# Patient Record
Sex: FEMALE
[De-identification: names, ages, dates, MRNs, and addresses within clinical notes are randomized per-mention and may not be internally consistent; named-entity substitution may affect disease eponyms.]

---

## 2023-05-11 ENCOUNTER — HOSPITAL ENCOUNTER (EMERGENCY)
Dept: HOSPITAL 97 - ER | Age: 33
Discharge: HOME | End: 2023-05-11
Payer: COMMERCIAL

## 2023-05-11 VITALS — SYSTOLIC BLOOD PRESSURE: 115 MMHG | OXYGEN SATURATION: 99 % | TEMPERATURE: 98.8 F | DIASTOLIC BLOOD PRESSURE: 80 MMHG

## 2023-05-11 DIAGNOSIS — R21: Primary | ICD-10-CM

## 2023-05-11 PROCEDURE — 99283 EMERGENCY DEPT VISIT LOW MDM: CPT

## 2023-05-11 NOTE — EDPHYS
Physician Documentation                                                                           

 [facility]                                                                 

Name: Osmany Howard                                                                                

Age: 32 yrs                                                                                       

Sex: Female                                                                                       

: 1990                                                                                   

MRN: [id number]                                                                                   

Arrival Date: 2023                                                                          

Time: 10:52                                                                                       

Account#: [id number]                                                                            

Bed 13                                                                                            

Private MD:                                                                                       

ED Physician Scott Calvert                                                                     

HPI:                                                                                              

                                                                                             

11:13 This 32 yrs old  Female presents to ER via Ambulatory with complaints of Rash.  [facility] 

11:13 Is a 32-year-old female with no chronic medical conditions that presents to the         [facility] 

      emergency department with complaints of rash to the arms, chest. Children had similar       

      rash which cleared. Patient states the rash is itchy, denies fever, denies swelling         

      sensation to her throat..                                                                   

                                                                                                  

OB/GYN:                                                                                           

11:17 LMP 2023                                                                            [facility] 

                                                                                                  

Historical:                                                                                       

- Allergies:                                                                                      

11:06 No Known Allergies;                                                                     ld1 

- Home Meds:                                                                                      

11:06 None [Active];                                                                          ld1 

- PMHx:                                                                                           

11:06 None;                                                                                   ld1 

- PSHx:                                                                                           

11:06 Cholecystectomy;  section;                                                      ld1 

                                                                                                  

- Immunization history:: Adult Immunizations up to date, Client reports receiving the             

  2nd dose of the Covid vaccine.                                                                  

- Social history:: Smoking status: Patient denies any tobacco usage or history of.                

  Patient/guardian denies using alcohol.                                                          

                                                                                                  

                                                                                                  

ROS:                                                                                              

11:13 Constitutional: Negative for fever, chills, and weight loss, Cardiovascular: Negative   [facility] 

      for chest pain, palpitations, and edema, Respiratory: Negative for shortness of breath,     

      cough, wheezing, and pleuritic chest pain.                                                  

11:13 Skin: Positive for rash.                                                                    

11:13 All other systems are negative.                                                             

                                                                                                  

Exam:                                                                                             

11:13 Constitutional:  This is a well developed, well nourished patient who is awake, alert,  jmm 

      and in no acute distress. Head/Face:  atraumatic. Eyes:  EOMI, no conjunctival erythema     

      appreciated ENT:  Moist Mucus Membranes Neck:  Trachea midline, Supple Chest/axilla:        

      Normal chest wall appearance and motion.   Cardiovascular:  Regular rate and rhythm.        

      No edema appreciated Respiratory:  Normal respirations, no respiratory distress             

      appreciated Abdomen/GI:  Non distended Back:  Normal ROM                                    

11:13 MS/ Extremity:  Moves all extremities, no obvious deformities appreciated, no edema         

      noted to the lower extremities  Neuro:  Awake and alert Psych:  Behavior is normal,         

      Mood is normal, Patient is cooperative and pleasant                                         

11:13 Skin: Papular rash noted to the arms and chest, no induration, nontender to palpation.      

                                                                                                  

Vital Signs:                                                                                      

11:05 Weight 88.9 kg; Height 5 ft. 5 in. ; Pain 0/10;                                         ld1 

11:10  / 80; Pulse 85; Resp 16; Temp 98.8(O); Pulse Ox 99% on R/A;                      nj1 

11:05 Body Mass Index 32.62 (88.90 kg, 165.1 cm)                                              ld1 

11:05 Pain Scale: Adult                                                                       ld1 

                                                                                                  

MDM:                                                                                              

11:13 Patient medically screened.                                                             [facility] 

14:16 Differential diagnosis: allergic reaction. Data reviewed: vital signs, nurses notes. ED jmm 

      course: Patient is alert nontoxic in appearance in the ED. Will treat with steroids.        

      Patient otherwise advised to follow with dermatology and otherwise given strict return      

      precautions. Patient instructed agrees to plan of care..                                    

                                                                                                  

Administered Medications:                                                                         

No medications were administered                                                                  

                                                                                                  

                                                                                                  

Disposition:                                                                                      

16:15 Co-signature as Attending Physician, Scott Calvert MD I reviewed the patient's care   rt  

      provided by the Advanced Practice Provider and agree with the diagnosis and treatment       

      plan.                                                                                       

                                                                                                  

Disposition Summary:                                                                              

23 11:15                                                                                    

Discharge Ordered                                                                                 

      Location: Home                                                                          [facility] 

      Condition: Stable                                                                       jmm 

      Diagnosis                                                                                   

        - Rash and other nonspecific skin eruption                                            jmm 

      Followup:                                                                               jmm 

        - With: Private Physician                                                                  

        - When: 2 - 3 days                                                                         

        - Reason: Recheck today's complaints, Continuance of care, Re-evaluation by your           

      physician                                                                                   

      Followup:                                                                               jm 

        - With: Jonahtan Fernandes MD                                                                   

        - When: 2 - 3 days                                                                         

        - Reason: Recheck today's complaints, Continuance of care, Re-evaluation by your           

      physician                                                                                   

      Discharge Instructions:                                                                     

        - Discharge Summary Sheet                                                             [facility] 

        - Rash, Adult                                                                         jmm 

      Forms:                                                                                      

        - Medication Reconciliation Form                                                      [facility] 

        - Thank You Letter                                                                    [facility] 

        - Antibiotic Education                                                                [facility] 

        - Prescription Opioid Use                                                             [facility] 

        - Family Work Release                                                                 rg4 

      Prescriptions:                                                                              

        - Elimite 5 % Topical Cream                                                                

            - apply 1 application by TOPICAL route one time Wash after 12 hours.; 60 gram;    [facility] 

      Refills: 0, Product Selection Permitted                                                     

        - Hydroxyzine HCl 25 mg Oral Tablet                                                        

            - take 1 tablet by ORAL route every 6 hours As needed; 30 tablet; Refills: 0,     [facility] 

      Product Selection Permitted                                                                 

        - Prednisone 20 mg Oral Tablet                                                             

            - take 3 tablets by ORAL route once daily for 5 days; 15 tablet; Refills: 0,      [facility] 

      Product Selection Permitted                                                                 

Signatures:                                                                                       

Steven Aponte PA PA jmm Sims, Lauren, RN                        RN   ld1                                                  

Scott Calvert MD MD   rt                                                   

                                                                                                  

**************************************************************************************************

## 2023-05-11 NOTE — ER
Nurse's Notes                                                                                     

 [facility]                                                                 

Name: Osmany Howard                                                                                

Age: 32 yrs                                                                                       

Sex: Female                                                                                       

: 1990                                                                                   

MRN: [id number]                                                                                   

Arrival Date: 2023                                                                          

Time: 10:52                                                                                       

Account#: [id number]                                                                            

Bed 13                                                                                            

Private MD:                                                                                       

Diagnosis: Rash and other nonspecific skin eruption                                               

                                                                                                  

Presentation:                                                                                     

                                                                                             

11:05 Chief complaint: Patient states: Itchy rash since Thursday. JOHN arms, legs, chest.      ld1 

      Coronavirus screen: At this time, the client does not indicate any symptoms associated      

      with coronavirus-19. Ebola Screen: No symptoms or risks identified at this time.            

      Initial Sepsis Screen:. Onset of symptoms was May 11, 2023.                                 

11:05 Method Of Arrival: Ambulatory                                                           ld1 

11:05 Acuity: CANDELARIA 4                                                                           ld1 

11:10 Initial Sepsis Screen: Does the patient meet any 2 criteria? No. Patient's initial      nj1 

      sepsis screen is negative. Does the patient have a suspected source of infection? No.       

      Patient's initial sepsis screen is negative. Risk Assessment: Do you want to hurt           

      yourself or someone else? Patient reports no desire to harm self or others.                 

                                                                                                  

Triage Assessment:                                                                                

11:06 General: Appears in no apparent distress. comfortable, Behavior is calm, cooperative,   ld1 

      appropriate for age. Pain: Denies pain. EENT: No signs and/or symptoms were reported        

      regarding the EENT system. Neuro: Level of Consciousness is awake, alert, obeys             

      commands, Oriented to person, place, time, situation. Cardiovascular: Capillary refill      

      < 3 seconds Patient's skin is warm and dry. Respiratory: Airway is patent Respiratory       

      effort is even, unlabored. GI: Abdomen is flat, non-distended. : No signs and/or          

      symptoms were reported regarding the genitourinary system. Derm: Rash noted that is         

      itchy. Musculoskeletal: No signs and/or symptoms reported regarding the musculoskeletal     

      system.                                                                                     

                                                                                                  

OB/GYN:                                                                                           

11:17 LMP 2023                                                                            [facility] 

                                                                                                  

Historical:                                                                                       

- Allergies:                                                                                      

11:06 No Known Allergies;                                                                     ld1 

- Home Meds:                                                                                      

11:06 None [Active];                                                                          ld1 

- PMHx:                                                                                           

11:06 None;                                                                                   ld1 

- PSHx:                                                                                           

11:06 Cholecystectomy;  section;                                                      ld1 

                                                                                                  

- Immunization history:: Adult Immunizations up to date, Client reports receiving the             

  2nd dose of the Covid vaccine.                                                                  

- Social history:: Smoking status: Patient denies any tobacco usage or history of.                

  Patient/guardian denies using alcohol.                                                          

                                                                                                  

                                                                                                  

Screenin:10 [facility] ED Fall Risk Assessment (Adult) History of falling in the last 3 months,       nj1 

      including since admission No falls in past 3 months (0 pts) Confusion or Disorientation     

      No (0 pts) Intoxicated or Sedated No (0 pts) Impaired Gait No (0 pts) Mobility Assist       

      Device Used No (0 pt) Altered Elimination No (0 pt) Score/Fall Risk Level 0 - 2 = Low       

      Risk Oriented to surroundings, Maintained a safe environment, Hourly rounding (assess       

      needs \T\ fall precautionary measures) done.                                                

11:10 Abuse screen: Denies threats or abuse. Denies injuries from another. Nutritional        nj1 

      screening: No deficits noted. Tuberculosis screening: No symptoms or risk factors           

      identified.                                                                                 

                                                                                                  

Vital Signs:                                                                                      

11:05 Weight 88.9 kg; Height 5 ft. 5 in. ; Pain 0/10;                                         ld1 

11:10  / 80; Pulse 85; Resp 16; Temp 98.8(O); Pulse Ox 99% on R/A;                      nj1 

11:05 Body Mass Index 32.62 (88.90 kg, 165.1 cm)                                              ld1 

11:05 Pain Scale: Adult                                                                       ld1 

                                                                                                  

ED Course:                                                                                        

10:56 Patient arrived in ED.                                                                  mr  

10:57 Steven Aponte PA is PHCP.                                                              jmm 

10:57 Scott Calvert MD is Attending Physician.                                            jmm 

11:05 Kat Devine, RN is Primary Nurse.                                                       nj1 

11:06 Triage completed.                                                                       ld1 

11:06 Arm band placed on right wrist.                                                         ld1 

11:10 Patient has correct armband on for positive identification. Bed in low position. Call   [facility] 

      light in reach. Adult w/ patient.                                                           

11:15 Jonathan Fernandes MD is Referral Physician.                                                 jmm 

11:15 No provider procedures requiring assistance completed. Patient did not have IV access   [facility] 

      during this emergency room visit.                                                           

                                                                                                  

Administered Medications:                                                                         

No medications were administered                                                                  

                                                                                                  

                                                                                                  

Medication:                                                                                       

11:20 VIS not applicable for this client.                                                     nj1 

                                                                                                  

Outcome:                                                                                          

11:15 Discharge ordered by MD.                                                                jm 

11:20 Discharged to home ambulatory, with family.                                             nj1 

11:20 Condition: stable                                                                           

11:20 Discharge instructions given to patient, family, Instructed on discharge instructions,      

      follow up and referral plans. medication usage, Demonstrated understanding of               

      instructions, follow-up care, medications, Prescriptions given X 3.                         

11:30 Patient left the ED.                                                                    nj1 

                                                                                                  

Signatures:                                                                                       

Steven Aponte PA PA jmm Rivera, Mary mr Sims, Lauren, RN                        RN   ld1                                                  

Kat Devine RN                         RN   nj1                                                  

                                                                                                  

**************************************************************************************************

## 2023-05-11 NOTE — XMS REPORT
Continuity of Care Document

                             Created on:May 11, 2023



Patient:EITAN SUAREZ

Sex:Female

:1990

External Reference #:[id number]





Demographics







                          Address                   [address]



                                                    [place] [address]

 

                          Home Phone                [phone]

 

                          Work Phone                [phone]

 

                          Mobile Phone              [phone]

 

                          Email Address             [email or web address]

 

                          Preferred Language        en-US

 

                          Marital Status            Unknown

 

                          Gnosticism Affiliation     Unknown

 

                          Race                      Unknown

 

                          Additional Race(s)        Unavailable

 

                          Ethnic Group              Unknown









Author







                          Organization              [facility]

t

 

                          Address                   [address]



                                                    [place] [address]

 

                          Phone                     [phone]









Support







                Name            Relationship    Address         Phone

 

                BETITO SUAREZ              [address]   [phone]



                                                [place] [address] 

 

                BETITO SUAREZ              [address] [phone]



                                                [place] [address] 









Care Team Providers







                    Name                Role                Phone

 

                    Imer Jimenez MD Primary Care Physician [phone]

 

                    CARINA CHRISTY Attending Clinician Unavailable

 

                    Marlen Alicea      Attending Clinician [phone]

 

                    MARLEN ALICEA      Attending Clinician Unavailable

 

                    WELLNESS            Attending Clinician Unavailable









Payers







           Payer Name Policy Type Policy Number Effective Date Expiration Date ALISON ARREOLA-            [id number]            2022 00:00:00

 



           [employer]                                             







Problems







       Condition Condition Condition Status Onset  Resolution Last   Treating Co

mments 

Source



       Name   Details Category        Date   Date   Treatment Clinician        



                                                 Date                 

 

       UNK     UNK   Diagnosis Active 2021               [facility]

oria



              Active                         14:03:00               l



              2021               00:00:                             Andrei jerez



               [address]                                                  

 

       No known No known Disease                                           [place]

r



       active active                                                  [place]



       problems problems                                                  of



                                                                      [facility]



                                                                      e

 

       Gallbladde  Gallbladd Problem Active               2021            

   [facility]



       r calculus er                                 00:53:41               l



       (disorder) calculus                                                  [facility]

thania



              (disorder)                                                  



              Active                                                  



              Problem                                                  



              2021                                                  



               Medical                                                  



              Group,[facility]                                                  

 

       History of  History Problem Active               2021              

 [facility]



       -      of -                               00:53:41               l



       gynecologi gynecologi                                                  He

rmann



       carlos alberto    carlos alberto                                                     



       disorder disorder                                                  



       (context-d (context-d                                                  



       ependent ependent                                                  



       category) category)                                                  



              Active                                                  



              Problem                                                  



              2021                                                  



               Medical                                                  



              Group,                                                  



              [facility]                                                  

 

       Simple  Simple Problem Active               2021               [facility]

tony



       obesity obesity                             00:53:41               l



       (disorder) (disorder)                                                  He

rmann



              Active                                                  



              Problem                                                  



              2021                                                  



               Medical                                                  



              Group                                                   







Allergies, Adverse Reactions, Alerts







       Allergy Allergy Status Severity Reaction(s) Onset  Inactive Treating Comm

ents 

Source



       Name   Type                        Date   Date   Clinician        

 

       No Known DA     Active U             2013                      [facility]



       Intolera                             2-08                        [facility]



       [facility]                               00[facility]:                      [facility]



                                          [facility]                          [facility]



                                                                      [facility]



                                                                      l

 

       No Known DA     Active U      NONE   2013                      [facility]



       Intolera                             2-08                        [facility]



       [facility]                               00:00:                      [facility]



                                          00                          l



                                                                      [facility]osp[facility]



                                                                      l

 

       No Known No Known Active                                           [facility]

a



       [facility]                                                  l



       on     on                                                      Eligio Herrera                                                  



       s      s                                                       







Social History







           Social Habit Start Date Stop Date  Quantity   Comments   Source

 

           History [facility]

ge



           Alcohol Std                                             of Medicine



           Drinks                                                 

 

           History [facility]

ge



           Alcohol Binge                                             of Medicine

 

           History [facility]

ge



           Alcohol Frequency                                             of Medi

cine

 

           Alcohol Comment 2022 Occasional            [facility]

llege



                      00:00:00   00:00:00                         of Medicine

 

           Tobacco use and 2022 Smokeless tobacco            [facility]

[facility]



           exposure   00:00:00   00:00:00   non-user              of Medicine

 

           Alcohol intake 2022 0 /d                  [facility]

lege



                      00:00:00   00:00:00                         of Medicine

 

           [facility] 2022 Not sure              [facility]

e



           SARS-CoV-2 00:00:00   10:31:00                         of Medicine



           (event)                                                

 

           Social History 2021                       [facility]

luis carlos



                      19:03:59   19:03:59                         

 

           Sex Assigned At 1990                       [facility]

llege



           Birth      00:00:00   00:00:00                         of Medicine









                Smoking Status  Start Date      Stop Date       Source

 

                Never smoked tobacco                                 [facility]

ege of Medicine







Medications







       Ordered Filled Start  Stop   Current Ordering Indication Dosage Frequency

 Signature

                    Comments            Components          Source



     Medication Medication Date Date Medication? Clinician                (SIG) 

          



     Name Name                                                   

 

     5-Methyltet            Yes                      Pill form           [facility]

[facility]



     rahydrofola      324                                              [facility]



     te (METHYL      09:44:                                              of



     FOLATE)      24                                                [facility]



     POWD                                                        e

 

     Labetalol            No                       10 mg,           [facility]

a



                                              Route:           l



               15:38:                               IVP,           [place]



               00                                 Q5Min,           



                                                  Dosing           



                                                  Weight           



                                                  90.568,           



                                                  kg, PRN           



                                                  Elevated           



                                                  BP, Start           



                                                  date:           



                                                  21           



                                                  10:38:00           



                                                  CDT,           



                                                  Duration:           



                                                  5 doses or           



                                                  times,           



                                                  Stop date:           



                                                  Limited #           



                                                  of times           

 

     Acetaminoph      2021-0      No                       1,000 mg,           M

emoria



     en                                       Route: PO,           l



               15:38:                               Drug form:           [facility]



               00                                 TAB, ONCE,           



                                                  Dosing           



                                                  Weight           



                                                  90.568,           



                                                  kg, PRN           



                                                  Pain Score           



                                                  1-3, Start           



                                                  date:           



                                                  21           



                                                  10:38:00           



                                                  CDT            

 

     Labetalol      1-0      No                       10 mg,           [facility]

a



                                              Route:           l



               15:38:                               IVP,           [facility]



               00                                 Q5Min,           



                                                  Dosing           



                                                  Weight           



                                                  90.568,           



                                                  kg, PRN           



                                                  Elevated           



                                                  BP, Start           



                                                  date:           



                                                  21           



                                                  10:38:00           



                                                  CDT,           



                                                  Duration:           



                                                  5 doses or           



                                                  times,           



                                                  Stop date:           



                                                  Limited #           



                                                  of times           

 

     Acetaminoph      2021-0      No                       1,000 mg,           M

emoria



     en                                       Route: PO,           l



               15:38:                               Drug form:           [place]



               00                                 TAB, ONCE,           



                                                  Dosing           



                                                  Weight           



                                                  90.568,           



                                                  kg, PRN           



                                                  Pain Score           



                                                  1-3, Start           



                                                  date:           



                                                  21           



                                                  10:38:00           



                                                  CDT            

 

     Fentanyl      1-0      No                       25             [facility]



               6-02                               microgram,           l



               15:38:                               Route:           [facility]



               00                                 IVP,           



                                                  Q5Min,           



                                                  Dosing           



                                                  Weight           



                                                  90.568,           



                                                  kg, PRN           



                                                  Pain Score           



                                                  4-6,           



                                                  Priority:           



                                                  Routine,           



                                                  Start           



                                                  date:           



                                                  21           



                                                  10:38:00           



                                                  CDT,           



                                                  Duration:           



                                                  4 doses or           



                                                  times,           



                                                  Stop date:           



                                                  Limited #           



                                                  of times           

 

     Fentanyl      2021-0      No                       25             [facility]



               6-02                               microgram,           l



               15:38:                               Route:           [facility]



               00                                 IVP,           



                                                  Q5Min,           



                                                  Dosing           



                                                  Weight           



                                                  90.568,           



                                                  kg, PRN           



                                                  Pain Score           



                                                  4-6,           



                                                  Priority:           



                                                  Routine,           



                                                  Start           



                                                  date:           



                                                  21           



                                                  10:38:00           



                                                  CDT,           



                                                  Duration:           



                                                  4 doses or           



                                                  times,           



                                                  Stop date:           



                                                  Limited #           



                                                  of times           

 

     Hydromorpho      2021-0      No                       0.5 mg,           Mem

oria



     ne                                       Route:           l



               15:38:                               IVP,           [facility]



               00                                 Q5Min,           



                                                  Dosing           



                                                  Weight           



                                                  90.568,           



                                                  kg, PRN           



                                                  Pain Score           



                                                  7-10,           



                                                  Start           



                                                  date:           



                                                  21           



                                                  10:38:00           



                                                  CDT,           



                                                  Duration:           



                                                  4 doses or           



                                                  times,           



                                                  Stop date:           



                                                  Limited #           



                                                  of times           

 

     Flumazenil      1-0      No                       0.2 mg,           [facility]

tony



                                              Route:           l



               15:38:                               IVP, PRN,           [facility]



               00                                 Dosing           



                                                  Weight           



                                                  90.568,           



                                                  kg, PRN           



                                                  Benzodiaze           



                                                  pine           



                                                  Reversal,           



                                                  Initial           



                                                  dose,           



                                                  Start           



                                                  date:           



                                                  21           



                                                  10:38:00           



                                                  CDT,           



                                                  Duration:           



                                                  30 day,           



                                                  Stop date:           



                                                  21           



                                                  10:37:00           



                                                  CDT            

 

     Naloxone      1-0      No                       0.4 mg,           [facility]

a



                                              Route:           l



               15:38:                               IVP,           [place]



               00                                 Q2MIN,           



                                                  Dosing           



                                                  Weight           



                                                  90.568,           



                                                  kg, PRN           



                                                  Narcotic           



                                                  Reversal,           



                                                  Start           



                                                  date:           



                                                  21           



                                                  10:38:00           



                                                  CDT,           



                                                  Duration:           



                                                  8 doses or           



                                                  times,           



                                                  Stop date:           



                                                  Limited #           



                                                  of times           

 

     Meperidine      -0      No                       12.5 mg,           Mem

oria



                                              Route:           l



               15:38:                               IVP,           [facility]



               00                                 Q30Min,           



                                                  Dosing           



                                                  Weight           



                                                  90.568,           



                                                  kg, PRN           



                                                  Other -See           



                                                  Comment,           



                                                  For            



                                                  shivering,           



                                                  Start           



                                                  date:           



                                                  21           



                                                  10:38:00           



                                                  CDT,           



                                                  Duration:           



                                                  2 doses or           



                                                  times,           



                                                  Stop date:           



                                                  Limited #           



                                                  of times           

 

     Ondansetron      1-0      No                       4 mg,           [facility]

[facility]



                                              Route:           l



               15:38:                               IVP, ONCE,           [facility]



               00                                 Dosing           



                                                  Weight           



                                                  90.568,           



                                                  kg, PRN           



                                                  Nausea &           



                                                  Vomiting,           



                                                  Start           



                                                  date:           



                                                  21           



                                                  10:38:00           



                                                  CDT            

 

     Hydromorpho      1-0      No                       0.5 mg,           [facility]

[facility]



     ne                                       Route:           l



               15:38:                               IVP,           [facility]



               00                                 Q5Min,           



                                                  Dosing           



                                                  Weight           



                                                  90.568,           



                                                  kg, PRN           



                                                  Pain Score           



                                                  7-10,           



                                                  Start           



                                                  date:           



                                                  21           



                                                  10:38:00           



                                                  CDT,           



                                                  Duration:           



                                                  4 doses or           



                                                  times,           



                                                  Stop date:           



                                                  Limited #           



                                                  of times           

 

     Flumazenil      1-0      No                       0.2 mg,           [facility]

[facility]



                                              Route:           l



               15:38:                               IVP, PRN,           [place]



               00                                 Dosing           



                                                  Weight           



                                                  90.568,           



                                                  kg, PRN           



                                                  Benzodiaze           



                                                  pine           



                                                  Reversal,           



                                                  Initial           



                                                  dose,           



                                                  Start           



                                                  date:           



                                                  21           



                                                  10:38:00           



                                                  CDT,           



                                                  Duration:           



                                                  30 day,           



                                                  Stop date:           



                                                  21           



                                                  10:37:00           



                                                  CDT            

 

     Naloxone      -0      No                       0.4 mg,           [facility]

[facility]



                                              Route:           l



               15:38:                               IVP,           [place]



               00                                 Q2MIN,           



                                                  Dosing           



                                                  Weight           



                                                  90.568,           



                                                  kg, PRN           



                                                  Narcotic           



                                                  Reversal,           



                                                  Start           



                                                  date:           



                                                  21           



                                                  10:38:00           



                                                  CDT,           



                                                  Duration:           



                                                  8 doses or           



                                                  times,           



                                                  Stop date:           



                                                  Limited #           



                                                  of times           

 

     Meperidine      1-0      No                       12.5 mg,           [facility]

[facility]



                                              Route:           l



               15:38:                               IVP,           [place]



               00                                 Q30Min,           



                                                  Dosing           



                                                  Weight           



                                                  90.568,           



                                                  kg, PRN           



                                                  Other -See           



                                                  Comment,           



                                                  For            



                                                  shivering,           



                                                  Start           



                                                  date:           



                                                  21           



                                                  10:38:00           



                                                  CDT,           



                                                  Duration:           



                                                  2 doses or           



                                                  times,           



                                                  Stop date:           



                                                  Limited #           



                                                  of times           

 

     Ondansetron      1-0      No                       4 mg,           [facility]

[facility]



                                              Route:           l



               15:38:                               IVP, ONCE,           [place]



               00                                 Dosing           



                                                  Weight           



                                                  90.568,           



                                                  kg, PRN           



                                                  Nausea &           



                                                  Vomiting,           



                                                  Start           



                                                  date:           



                                                  21           



                                                  10:38:00           



                                                  CDT            

 

     Labetalol      1-0      No                       10 mg,           [facility]

[facility]



                                              Route:           l



               15:38:                               IVP,           [place]



               00                                 Q5Min,           



                                                  Dosing           



                                                  Weight           



                                                  90.568,           



                                                  kg, PRN           



                                                  Elevated           



                                                  BP, Start           



                                                  date:           



                                                  21           



                                                  10:38:00           



                                                  CDT,           



                                                  Duration:           



                                                  5 doses or           



                                                  times,           



                                                  Stop date:           



                                                  Limited #           



                                                  of times           

 

     Acetaminoph      -0      No                       1,000 mg,           [facility]

[facility]



     en                                       Route: PO,           l



               15:38:                               Drug form:           [facility]



               00                                 TAB, ONCE,           



                                                  Dosing           



                                                  Weight           



                                                  90.568,           



                                                  kg, PRN           



                                                  Pain Score           



                                                  1-3, Start           



                                                  date:           



                                                  21           



                                                  10:38:00           



                                                  CDT            

 

     Fentanyl      -0      No                       25             [facility]



               -02                               microgram,           l



               15:38:                               Route:           [place]



               00                                 IVP,           



                                                  Q5Min,           



                                                  Dosing           



                                                  Weight           



                                                  90.568,           



                                                  kg, PRN           



                                                  Pain Score           



                                                  4-6,           



                                                  Priority:           



                                                  Routine,           



                                                  Start           



                                                  date:           



                                                  21           



                                                  10:38:00           



                                                  CDT,           



                                                  Duration:           



                                                  4 doses or           



                                                  times,           



                                                  Stop date:           



                                                  Limited #           



                                                  of times           

 

     Hydromorpho      1-0      No                       0.5 mg,           [facility]

[facility]



     ne        6-02                               Route:           l



               15:38:                               IVP,           [facility]



               00                                 Q5Min,           



                                                  Dosing           



                                                  Weight           



                                                  90.568,           



                                                  kg, PRN           



                                                  Pain Score           



                                                  7-10,           



                                                  Start           



                                                  date:           



                                                  21           



                                                  10:38:00           



                                                  CDT,           



                                                  Duration:           



                                                  4 doses or           



                                                  times,           



                                                  Stop date:           



                                                  Limited #           



                                                  of times           

 

     Flumazenil      -0      No                       0.2 mg,           [facility]

[facility]



                                              Route:           l



               15:38:                               IVP, PRN,           [place]



               00                                 Dosing           



                                                  Weight           



                                                  90.568,           



                                                  kg, PRN           



                                                  Benzodiaze           



                                                  pine           



                                                  Reversal,           



                                                  Initial           



                                                  dose,           



                                                  Start           



                                                  date:           



                                                  21           



                                                  10:38:00           



                                                  CDT,           



                                                  Duration:           



                                                  30 day,           



                                                  Stop date:           



                                                  21           



                                                  10:37:00           



                                                  CDT            

 

     Naloxone      -0      No                       0.4 mg,           [facility]

[facility]



                                              Route:           l



               15:38:                               IVP,           [facility]



               00                                 Q2MIN,           



                                                  Dosing           



                                                  Weight           



                                                  90.568,           



                                                  kg, PRN           



                                                  Narcotic           



                                                  Reversal,           



                                                  Start           



                                                  date:           



                                                  21           



                                                  10:38:00           



                                                  CDT,           



                                                  Duration:           



                                                  8 doses or           



                                                  times,           



                                                  Stop date:           



                                                  Limited #           



                                                  of times           

 

     Meperidine      -0      No                       12.5 mg,           [facility]

[facility]



                                              Route:           l



               15:38:                               IVP,           [facility]



               00                                 Q30Min,           



                                                  Dosing           



                                                  Weight           



                                                  90.568,           



                                                  kg, PRN           



                                                  Other -See           



                                                  Comment,           



                                                  For            



                                                  shivering,           



                                                  Start           



                                                  date:           



                                                  21           



                                                  10:38:00           



                                                  CDT,           



                                                  Duration:           



                                                  2 doses or           



                                                  times,           



                                                  Stop date:           



                                                  Limited #           



                                                  of times           

 

     Ondansetron      -0      No                       4 mg,           [facility]

[facility]



                                              Route:           l



               15:38:                               IVP, ONCE,           [facility]



               00                                 Dosing           



                                                  Weight           



                                                  90.568,           



                                                  kg, PRN           



                                                  Nausea &           



                                                  Vomiting,           



                                                  Start           



                                                  date:           



                                                  21           



                                                  10:38:00           



                                                  CDT            

 

     Labetalol      -0      No                       10 mg,           [facility]

[facility]



                                              Route:           l



               15:38:                               IVP,           [place]



               00                                 Q5Min,           



                                                  Dosing           



                                                  Weight           



                                                  90.568,           



                                                  kg, PRN           



                                                  Elevated           



                                                  BP, Start           



                                                  date:           



                                                  21           



                                                  10:38:00           



                                                  CDT,           



                                                  Duration:           



                                                  5 doses or           



                                                  times,           



                                                  Stop date:           



                                                  Limited #           



                                                  of times           

 

     Acetaminoph      -0      No                       1,000 mg,           [facility]

[facility]



     en                                       Route: PO,           l



               15:38:                               Drug form:           [place]



               00                                 TAB, ONCE,           



                                                  Dosing           



                                                  Weight           



                                                  90.568,           



                                                  kg, PRN           



                                                  Pain Score           



                                                  1-3, Start           



                                                  date:           



                                                  21           



                                                  10:38:00           



                                                  CDT            

 

     Fentanyl      -0      No                       25             [facility]



               -                               microgram,           l



               15:38:                               Route:           [facility]



               00                                 IVP,           



                                                  Q5Min,           



                                                  Dosing           



                                                  Weight           



                                                  90.568,           



                                                  kg, PRN           



                                                  Pain Score           



                                                  4-6,           



                                                  Priority:           



                                                  Routine,           



                                                  Start           



                                                  date:           



                                                  21           



                                                  10:38:00           



                                                  CDT,           



                                                  Duration:           



                                                  4 doses or           



                                                  times,           



                                                  Stop date:           



                                                  Limited #           



                                                  of times           

 

     Hydromorpho      -0      No                       0.5 mg,           [facility]

[facility]



     ne                                       Route:           l



               15:38:                               IVP,           Eligio



               00                                 Q5Min,           



                                                  Dosing           



                                                  Weight           



                                                  90.568,           



                                                  kg, PRN           



                                                  Pain Score           



                                                  7-10,           



                                                  Start           



                                                  date:           



                                                  21           



                                                  10:38:00           



                                                  CDT,           



                                                  Duration:           



                                                  4 doses or           



                                                  times,           



                                                  Stop date:           



                                                  Limited #           



                                                  of times           

 

     Flumazenil      -0      No                       0.2 mg,           [facility]rett

tony



                                              Route:           l



               15:38:                               IVP, PRN,           [facility]lig[facility]



               00                                 Dosing           



                                                  Weight           



                                                  90.568,           



                                                  kg, PRN           



                                                  Benzodiaze           



                                                  pine           



                                                  Reversal,           



                                                  Initial           



                                                  dose,           



                                                  Start           



                                                  date:           



                                                  21           



                                                  10:38:00           



                                                  CDT,           



                                                  Duration:           



                                                  30 day,           



                                                  Stop date:           



                                                  21           



                                                  10:37:00           



                                                  CDT            

 

     Naloxone      -0      No                       0.4 mg,           [facility]

a



                                              Route:           l



               15:38:                               IVP,           Eligio



               00                                 Q2MIN,           



                                                  Dosing           



                                                  Weight           



                                                  90.568,           



                                                  kg, PRN           



                                                  Narcotic           



                                                  Reversal,           



                                                  Start           



                                                  date:           



                                                  21           



                                                  10:38:00           



                                                  CDT,           



                                                  Duration:           



                                                  8 doses or           



                                                  times,           



                                                  Stop date:           



                                                  Limited #           



                                                  of times           

 

     Meperidine      -0      No                       12.5 mg,           [facility]

[facility]



                                              Route:           l



               15:38:                               IVP,           [facility]lig[facility]



               00                                 Q30Min,           



                                                  Dosing           



                                                  Weight           



                                                  90.568,           



                                                  kg, PRN           



                                                  Other -See           



                                                  Comment,           



                                                  For            



                                                  shivering,           



                                                  Start           



                                                  date:           



                                                  21           



                                                  10:38:00           



                                                  CDT,           



                                                  Duration:           



                                                  2 doses or           



                                                  times,           



                                                  Stop date:           



                                                  Limited #           



                                                  of times           

 

     Ondansetron      -0      No                       4 mg,           [facility]

ia



                                              Route:           l



               15:38:                               IVP, ONCE,           [place]



               00                                 Dosing           



                                                  Weight           



                                                  90.568,           



                                                  kg, PRN           



                                                  Nausea &           



                                                  Vomiting,           



                                                  Start           



                                                  date:           



                                                  21           



                                                  10:38:00           



                                                  CDT            

 

     glycopyrrol      0      No                       Route: IV,           

[facility]



     ate (ANES)                                     Drug form:           l



               15:36:                               INJ, ONCE,                                            Stop date:           



                                                  21           



                                                  10:36:00           



                                                  CDT            

 

     neostigmine      0      No                       Route: IV,           

[facility]



     (ANES)                                     Drug form:           l



               15:36:                               INJ, ONCE,                                            Stop date:           



                                                  21           



                                                  10:36:00           



                                                  CDT            

 

     glycopyrrol      0      No                       Route: IV,           

[facility]



     ate (ANES)                                     Drug form:           l



               15:36:                               INJ, ONCE,                                            Stop date:           



                                                  21           



                                                  10:36:00           



                                                  CDT            

 

     neostigmine      0      No                       Route: IV,           

[facility]



     (ANES)                                     Drug form:           l



               15:36:                               INJ, ONCE,                                            Stop date:           



                                                  21           



                                                  10:36:00           



                                                  CDT            

 

     glycopyrrol      0      No                       Route: IV,           

[facility]



     ate (ANES)                                     Drug form:           l



               15:36:                               INJ, ONCE,                                            Stop date:           



                                                  21           



                                                  10:36:00           



                                                  CDT            

 

     neostigmine      0      No                       Route: IV,           

[facility]



     (ANES)                                     Drug form:           l



               15:36:                               INJ, ONCE,                                            Stop date:           



                                                  21           



                                                  10:36:00           



                                                  CDT            

 

     glycopyrrol      0      No                       Route: IV,           

[facility]



     ate (ANES)                                     Drug form:           l



               15:36:                               INJ, ONCE,                                            Stop date:           



                                                  21           



                                                  10:36:00           



                                                  CDT            

 

     neostigmine      0      No                       Route: IV,           

[facility]



     (ANES)                                     Drug form:           l



               15:36:                               INJ, ONCE,                                            Stop date:           



                                                  21           



                                                  10:36:00           



                                                  CDT            

 

     phenylephri      0      No                       Route: IV,           

[facility]



     ne (ANES)                                     Drug form:           l



               15:33:                               INJ, ONCE,                                            Stop date:           



                                                  21           



                                                  10:33:00           



                                                  CDT            

 

     Docusate      0      Yes                      100 mg = 1           [facility]

[facility]



     Sodium 100      6-02                               cap, PO,           l



     MG Oral      15:33:                               BID, PRN           Andrei

n



     Capsule      00                                 Constipati           



     [Colace]                                         on, # 20           



                                                  cap, 0           



                                                  Refill(s),           



                                                  Pharmacy:           



                                                  [facility]           



                                                  Pharmacy           



                                                  3298,           



                                                  165.1, cm,           



                                                  21           



                                                  9:49:00           



                                                  CDT,           



                                                  Height,           



                                                  90.568,           



                                                  kg,            



                                                  21           



                                                  9:49:00           



                                                  CDT,           



                                                  Weight           

 

     Acetaminoph      0      Yes                      1 tab, PO,           

[facility]



     en 300 MG /      6-02                               Q6H, PRN           l



     Codeine      15:33:                               pain, X 7           [facility]

nn



     Phosphate      00                                 day, # 28           



     30 MG Oral                                         tab, 0           



     Tablet                                         Refill(s),           



     [Tylenol                                         Pharmacy:           



     with                                         [facility]           



     Codeine #3]                                         Pharmacy           



                                                  3298,           



                                                  165.1, cm,           



                                                  21           



                                                  9:49:00           



                                                  CDT,           



                                                  Height,           



                                                  90.568,           



                                                  kg,            



                                                  21           



                                                  9:49:00           



                                                  CDT,           



                                                  Weight           

 

     phenylephri            No                       Route: IV,           

[facility]



     ne (ANES)                                     Drug form:           l



               15:33:                               INJ, ONCE,           [place]



               00                                 Stop date:           



                                                  21           



                                                  10:33:00           



                                                  CDT            

 

     Docusate            Yes                      100 mg = 1           [facility]

[facility]



     Sodium 100      6-02                               cap, PO,           l



     MG Oral      15:33:                               BID, PRN           [facility]rei

n



     Capsule      00                                 Constipati           



     [Colace]                                         on, # 20           



                                                  cap, 0           



                                                  Refill(s),           



                                                  Pharmacy:           



                                                  [facility]           



                                                  Pharmacy           



                                                  3298,           



                                                  165.1, cm,           



                                                  21           



                                                  9:49:00           



                                                  CDT,           



                                                  Height,           



                                                  90.568,           



                                                  kg,            



                                                  21           



                                                  9:49:00           



                                                  CDT,           



                                                  Weight           

 

     Acetaminoph      0      Yes                      1 tab, PO,           

[facility]



     en 300 MG /      6-02                               Q6H, PRN           l



     Codeine      15:33:                               pain, X 7           [facility]

nn



     Phosphate      00                                 day, # 28           



     30 MG Oral                                         tab, 0           



     Tablet                                         Refill(s),           



     [Tylenol                                         Pharmacy:           



     with                                         [facility]           



     Codeine #3]                                         Pharmacy           



                                                  3298,           



                                                  165.1, cm,           



                                                  21           



                                                  9:49:00           



                                                  CDT,           



                                                  Height,           



                                                  90.568,           



                                                  kg,            



                                                  21           



                                                  9:49:00           



                                                  CDT,           



                                                  Weight           

 

     phenylephri      2021-0      No                       Route: IV,           

[facility]



     ne (ANES)                                     Drug form:           l



               15:33:                               INJ, ONCE,                                            Stop date:           



                                                  21           



                                                  10:33:00           



                                                  CDT            

 

     Docusate            Yes                      100 mg = 1           [facility]

[facility]



     Sodium 100      6-02                               cap, PO,           l



     MG Oral      15:33:                               BID, PRN           [facility]rei

n



     Capsule      00                                 Constipati           



     [Colace]                                         on, # 20           



                                                  cap, 0           



                                                  Refill(s),           



                                                  Pharmacy:           



                                                  [facility]           



                                                  Pharmacy           



                                                  3298,           



                                                  165.1, cm,           



                                                  21           



                                                  9:49:00           



                                                  CDT,           



                                                  Height,           



                                                  90.568,           



                                                  kg,            



                                                  21           



                                                  9:49:00           



                                                  CDT,           



                                                  Weight           

 

     Acetaminoph            Yes                      1 tab, PO,           

[facility]



     en 300 MG /      6-02                               Q6H, PRN           l



     Codeine      15:33:                               pain, X 7           [facility]

nn



     Phosphate      00                                 day, # 28           



     30 MG Oral                                         tab, 0           



     Tablet                                         Refill(s),           



     [Tylenol                                         Pharmacy:           



     with                                         [facility]           



     Codeine #3]                                         Pharmacy           



                                                  3298,           



                                                  165.1, cm,           



                                                  21           



                                                  9:49:00           



                                                  CDT,           



                                                  Height,           



                                                  90.568,           



                                                  kg,            



                                                  21           



                                                  9:49:00           



                                                  CDT,           



                                                  Weight           

 

     phenylephri            No                       Route: IV,           

[facility]



     ne (ANES)                                     Drug form:           l



               15:33:                               INJ, ONCE,                                            Stop date:           



                                                  21           



                                                  10:33:00           



                                                  CDT            

 

     Docusate            Yes                      100 mg = 1           [facility]

[facility]



     Sodium 100      6-02                               cap, PO,           l



     MG Oral      15:33:                               BID, PRN           [facility]rei

n



     Capsule      00                                 Constipati           



     [Colace]                                         on, # 20           



                                                  cap, 0           



                                                  Refill(s),           



                                                  Pharmacy:           



                                                  [facility]           



                                                  Pharmacy           



                                                  3298,           



                                                  165.1, cm,           



                                                  21           



                                                  9:49:00           



                                                  CDT,           



                                                  Height,           



                                                  90.568,           



                                                  kg,            



                                                  21           



                                                  9:49:00           



                                                  CDT,           



                                                  Weight           

 

     Acetaminoph            Yes                      1 tab, PO,           

[facility]



     en 300 MG /      6-02                               Q6H, PRN           l



     Codeine      15:33:                               pain, X 7           [facility]

nn



     Phosphate      00                                 day, # 28           



     30 MG Oral                                         tab, 0           



     Tablet                                         Refill(s),           



     [Tylenol                                         Pharmacy:           



     with                                         [facility]           



     Codeine #3]                                         Pharmacy           



                                                  3298,           



                                                  165.1, cm,           



                                                  21           



                                                  9:49:00           



                                                  CDT,           



                                                  Height,           



                                                  90.568,           



                                                  kg,            



                                                  21           



                                                  9:49:00           



                                                  CDT,           



                                                  Weight           

 

     lidocaine      -0      No                       Route: IV,           Me

[facility]



     (ANES)                                     Drug form:           l



               15:18:                               INJ, ONCE,           [facility]                                 Stop date:           



                                                  21           



                                                  10:18:00           



                                                  CDT            

 

     rocuronium      -0      No                       Route: IV,           M

[facility]



     (ANES)                                     Drug form:           l



               15:18:                               INJ, ONCE,                                            Stop date:           



                                                  21           



                                                  10:18:00           



                                                  CDT            

 

     succinylcho      -0      No                       Route: IV,           

[facility]



     line (ANES)                                     Drug form:           l



               15:18:                               INJ, ONCE,                                            Stop date:           



                                                  21           



                                                  10:18:00           



                                                  CDT            

 

     dexamethaso      -0      No                       Route: IV,           

[facility]



     ne (ANES)                                     Drug form:           l



               15:18:                               INJ, ONCE,                                            Stop date:           



                                                  21           



                                                  10:18:00           



                                                  CDT            

 

     midazolam      -0      No                       Route: IV,           Me

[facility]



     (ANES)                                     Drug form:           l



               15:18:                               SOLN,                                            ONCE, Stop           



                                                  date:           



                                                  21           



                                                  10:18:00           



                                                  CDT            

 

     lidocaine      -0      No                       Route: IV,           Me

[facility]



     (ANES)                                     Drug form:           l



               15:18:                               INJ, ONCE,                                            Stop date:           



                                                  21           



                                                  10:18:00           



                                                  CDT            

 

     rocuronium      -0      No                       Route: IV,           M

[facility]



     (ANES)                                     Drug form:           l



               15:18:                               INJ, ONCE,                                            Stop date:           



                                                  21           



                                                  10:18:00           



                                                  CDT            

 

     succinylcho      -0      No                       Route: IV,           

[facility]



     line (ANES)                                     Drug form:           l



               15:18:                               INJ, ONCE,                                            Stop date:           



                                                  21           



                                                  10:18:00           



                                                  CDT            

 

     dexamethaso      -0      No                       Route: IV,           

[facility]



     ne (ANES)                                     Drug form:           l



               15:18:                               INJ, ONCE,           [place]                                 Stop date:           



                                                  21           



                                                  10:18:00           



                                                  CDT            

 

     midazolam      -0      No                       Route: IV,           Me

[facility]



     (ANES)                                     Drug form:           l



               15:18:                               SOLN,           [place]



               00                                 ONCE, Stop           



                                                  date:           



                                                  21           



                                                  10:18:00           



                                                  CDT            

 

     lidocaine      -0      No                       Route: IV,           Me

[facility]



     (ANES)                                     Drug form:           l



               15:18:                               INJ, ONCE,           [place]                                 Stop date:           



                                                  21           



                                                  10:18:00           



                                                  CDT            

 

     rocuronium      -0      No                       Route: IV,           M

[facility]



     (ANES)                                     Drug form:           l



               15:18:                               INJ, ONCE,           [facility]                                 Stop date:           



                                                  21           



                                                  10:18:00           



                                                  CDT            

 

     succinylcho      -0      No                       Route: IV,           

[facility]



     line (ANES)                                     Drug form:           l



               15:18:                               INJ, ONCE,                                            Stop date:           



                                                  21           



                                                  10:18:00           



                                                  CDT            

 

     dexamethaso      -0      No                       Route: IV,           

[facility]



     ne (ANES)                                     Drug form:           l



               15:18:                               INJ, ONCE,           [facility]                                 Stop date:           



                                                  21           



                                                  10:18:00           



                                                  CDT            

 

     midazolam      -0      No                       Route: IV,           Me

[facility]



     (ANES)                                     Drug form:           l



               15:18:                               SOLN,           [place]



               00                                 ONCE, Stop           



                                                  date:           



                                                  21           



                                                  10:18:00           



                                                  CDT            

 

     midazolam      -0      No                       Route: IV,           Me

[facility]



     (ANES)                                     Drug form:           l



               15:18:                               SOLN,           [place]



               00                                 ONCE, Stop           



                                                  date:           



                                                  21           



                                                  10:18:00           



                                                  CDT            

 

     lidocaine      -0      No                       Route: IV,           Me

[facility]



     (ANES)                                     Drug form:           l



               15:18:                               INJ, ONCE,           [place]                                 Stop date:           



                                                  21           



                                                  10:18:00           



                                                  CDT            

 

     rocuronium      -0      No                       Route: IV,           M

[facility]



     (ANES)                                     Drug form:           l



               15:18:                               INJ, ONCE,           [place]                                 Stop date:           



                                                  21           



                                                  10:18:00           



                                                  CDT            

 

     succinylcho      -0      No                       Route: IV,           

[facility]



     line (ANES)      -                               Drug form:           l



               15:18:                               INJ, ONCE,                                            Stop date:           



                                                  21           



                                                  10:18:00           



                                                  CDT            

 

     dexamethaso      -0      No                       Route: IV,           

[facility]



     ne (ANES)      6-                               Drug form:           l



               15:18:                               INJ, ONCE,                                            Stop date:           



                                                  21           



                                                  10:18:00           



                                                  CDT            

 

     ondansetron      -0      No                       Route: IV,           

[facility]



     (ANES)      6-                               Drug form:           l



               15:12:                               INJ, ONCE,                                            Stop date:           



                                                  21           



                                                  10:12:00           



                                                  CDT            

 

     ondansetron      -0      No                       Route: IV,           

[facility]



     (ANES)      6-                               Drug form:           l



               15:12:                               INJ, ONCE,                                            Stop date:           



                                                  21           



                                                  10:12:00           



                                                  CDT            

 

     ondansetron      -0      No                       Route: IV,           

[facility]



     (ANES)      6-                               Drug form:           l



               15:12:                               INJ, ONCE,                                            Stop date:           



                                                  21           



                                                  10:12:00           



                                                  CDT            

 

     ondansetron      -0      No                       Route: IV,           

[facility]



     (ANES)                                     Drug form:           l



               15:12:                               INJ, ONCE,                                            Stop date:           



                                                  21           



                                                  10:12:00           



                                                  CDT            

 

     fentaNYL      -0      No                       Route: IV,           [facility]

[facility]



     (ANES)                                     Drug form:           l



               15:07:                               INJ, ONCE,                                            Stop date:           



                                                  21           



                                                  10:07:00           



                                                  CDT            

 

     propofol      -0      No                       Route: IV,           [facility]

[facility]



     (ANES)      6-                               Drug form:           l



               15:07:                               INJ, ONCE,                                            Stop date:           



                                                  21           



                                                  10:07:00           



                                                  CDT            

 

     ceFAZolin      -0      No                       Route: IV,           Me

[facility]



     (ANES)      6-                               Drug form:           l



               15:07:                               INJ, ONCE,                                            Stop date:           



                                                  21           



                                                  10:07:00           



                                                  CDT            

 

     fentaNYL      -0      No                       Route: IV,           [facility]

[facility]



     (ANES)      -                               Drug form:           l



               15:07:                               INJ, ONCE,                                            Stop date:           



                                                  21           



                                                  10:07:00           



                                                  CDT            

 

     propofol      2021-0      No                       Route: IV,           [facility]

[facility]



     (ANES)      6-                               Drug form:           l



               15:07:                               INJ, ONCE,                                            Stop date:           



                                                  21           



                                                  10:07:00           



                                                  CDT            

 

     ceFAZolin      -0      No                       Route: IV,           Me

[facility]



     (ANES)      6-                               Drug form:           l



               15:07:                               INJ, ONCE,                                            Stop date:           



                                                  21           



                                                  10:07:00           



                                                  CDT            

 

     fentaNYL      -0      No                       Route: IV,           [facility]

[facility]



     (ANES)      6-                               Drug form:           l



               15:07:                               INJ, ONCE,                                            Stop date:           



                                                  21           



                                                  10:07:00           



                                                  CDT            

 

     propofol      -0      No                       Route: IV,           [facility]

[facility]



     (ANES)      6-                               Drug form:           l



               15:07:                               INJ, ONCE,                                            Stop date:           



                                                  21           



                                                  10:07:00           



                                                  CDT            

 

     ceFAZolin      -0      No                       Route: IV,           Me

[facility]



     (ANES)      6-                               Drug form:           l



               15:07:                               INJ, ONCE,                                            Stop date:           



                                                  21           



                                                  10:07:00           



                                                  CDT            

 

     fentaNYL      -0      No                       Route: IV,           [facility]

[facility]



     (ANES)      6-                               Drug form:           l



               15:07:                               INJ, ONCE,                                            Stop date:           



                                                  21           



                                                  10:07:00           



                                                  CDT            

 

     propofol      -0      No                       Route: IV,           [facility]

[facility]



     (ANES)      6-                               Drug form:           l



               15:07:                               INJ, ONCE,                                            Stop date:           



                                                  21           



                                                  10:07:00           



                                                  CDT            

 

     ceFAZolin      -0      No                       Route: IV,           Me

[facility]



     (ANES)      6-02                               Drug form:           l



               15:07:                               INJ, ONCE,                                            Stop date:           



                                                  21           



                                                  10:07:00           



                                                  CDT            

 

     Lactated      -0      No                       Route: IV,           [facility]

[facility]



     Ringers      6-02                               Total           l



     Injection      14:32:                               Volume:           [facility]

nn



     IV (ANES)      00                                 1,000,           



     1000 mL                                         Start           



                                                  date:           



                                                  21           



                                                  9:32:00           



                                                  CDT, Stop           



                                                  date:           



                                                  21           



                                                  10:32:00           



                                                  CDT            

 

     Lactated      -0      No                       Route: IV,           [facility]

[facility]



     Ringers      6-02                               Total           l



     Injection      14:32:                               Volume:           [facility]

nn



     IV (ANES)      00                                 1,000,           



     1000 mL                                         Start           



                                                  date:           



                                                  21           



                                                  9:32:00           



                                                  CDT, Stop           



                                                  date:           



                                                  21           



                                                  10:32:00           



                                                  CDT            

 

     Lactated      -0      No                       Route: IV,           [facility]

[facility]



     Ringers      6-02                               Total           l



     Injection      14:32:                               Volume:           [facility]

[facility]



     IV (ANES)      00                                 1,000,           



     1000 mL                                         Start           



                                                  date:           



                                                  21           



                                                  9:32:00           



                                                  CDT, Stop           



                                                  date:           



                                                  21           



                                                  10:32:00           



                                                  CDT            

 

     Lactated      -0      No                       Route: IV,           [facility]

[facility]



     Ringers      6-02                               Total           l



     Injection      14:32:                               Volume:           [facility]

nn



     IV (ANES)      00                                 1,000,           



     1000 mL                                         Start           



                                                  date:           



                                                  21           



                                                  9:32:00           



                                                  CDT, Stop           



                                                  date:           



                                                  21           



                                                  10:32:00           



                                                  CDT            

 

     Calcium      -0      No                       1,000 mL,           [facility]

[facility]



     Chloride      6-02                               Rate: 75           l



     0.0014      13:37:                               ml/hr,           [place]



     MEQ/ML /      00                                 Infuse           



     Potassium                                         over: 13.3           



     Chloride                                         hr, Route:           



     0.004                                         IV, Dosing           



     MEQ/ML /                                         Weight           



     Sodium                                         90.568 kg,           



     Chloride                                         Total           



     0.103                                         Volume:           



     MEQ/ML /                                         1,000,           



     Sodium                                         Start           



     Lactate                                         date:           



     0.028                                         21           



     MEQ/ML                                         8:37:00           



     Injectable                                         CDT,           



     Solution                                         Duration:           



                                                  1 day,           



                                                  Stop date:           



                                                  21           



                                                  8:36:00           



                                                  CDT, BSA:           



                                                  2.07 m2, 0           

 

     Calcium      -0      No                       1,000 mL,           [facility]

ia



     Chloride      6-02                               Rate: 75           l



     0.0014      13:37:                               ml/hr,           [place]



     MEQ/ML /      00                                 Infuse           



     Potassium                                         over: 13.3           



     Chloride                                         hr, Route:           



     0.004                                         IV, Dosing           



     MEQ/ML /                                         Weight           



     Sodium                                         90.568 kg,           



     Chloride                                         Total           



     0.103                                         Volume:           



     MEQ/ML /                                         1,000,           



     Sodium                                         Start           



     Lactate                                         date:           



     0.028                                         21           



     MEQ/ML                                         8:37:00           



     Injectable                                         CDT,           



     Solution                                         Duration:           



                                                  1 day,           



                                                  Stop date:           



                                                  21           



                                                  8:36:00           



                                                  CDT, BSA:           



                                                  2.07 m2, 0           

 

     Calcium      -0      No                       1,000 mL,           [facility]

ia



     Chloride      6-02                               Rate: 75           l



     0.0014      13:37:                               ml/hr,           [place]



     MEQ/ML /      00                                 Infuse           



     Potassium                                         over: 13.3           



     Chloride                                         hr, Route:           



     0.004                                         IV, Dosing           



     MEQ/ML /                                         Weight           



     Sodium                                         90.568 kg,           



     Chloride                                         Total           



     0.103                                         Volume:           



     MEQ/ML /                                         1,000,           



     Sodium                                         Start           



     Lactate                                         date:           



     0.028                                         21           



     MEQ/ML                                         8:37:00           



     Injectable                                         CDT,           



     Solution                                         Duration:           



                                                  1 day,           



                                                  Stop date:           



                                                  21           



                                                  8:36:00           



                                                  CDT, BSA:           



                                                  2.07 m2, 0           

 

     Calcium      -      No                       1,000 mL,           [facility]

ia



     Chloride                                     Rate: 75           l



     0.0014      13:37:                               ml/hr,           [place]



     MEQ/ML /      00                                 Infuse           



     Potassium                                         over: 13.3           



     Chloride                                         hr, Route:           



     0.004                                         IV, Dosing           



     MEQ/ML /                                         Weight           



     Sodium                                         90.568 kg,           



     Chloride                                         Total           



     0.103                                         Volume:           



     MEQ/ML /                                         1,000,           



     Sodium                                         Start           



     Lactate                                         date:           



     0.028                                         21           



     MEQ/ML                                         8:37:00           



     Injectable                                         CDT,           



     Solution                                         Duration:           



                                                  1 day,           



                                                  Stop date:           



                                                  21           



                                                  8:36:00           



                                                  CDT, BSA:           



                                                  2.07 m2, 0           

 

     Metformin            Yes                      500 mg = 1           Me

mor[facility]



     hydrochlori      5-26                               tab, PO,           l



     de 500 MG      14:54:                               Daily,           Andrei

n



     Oral Tablet      00                                 take with           



                                                  a meal, #           



                                                  30 tab, 1           



                                                  Refill(s)           

 

     Metformin            Yes                      500 mg = 1           Me

moria



     hydrochlori      5-26                               tab, PO,           l



     de 500 MG      14:54:                               Daily,           Andrei

n



     Oral Tablet      00                                 take with           



                                                  a meal, #           



                                                  30 tab, 1           



                                                  Refill(s)           

 

     Metformin            Yes                      500 mg = 1           Me

moria



     hydrochlori      5-26                               tab, PO,           l



     de 500 MG      14:54:                               Daily,           Andrei

n



     Oral Tablet      00                                 take with           



                                                  a meal, #           



                                                  30 tab, 1           



                                                  Refill(s)           

 

     Metformin            Yes                      500 mg = 1           Me

moria



     hydrochlori      5-26                               tab, PO,           l



     de 500 MG      14:54:                               Daily,           Andrei

n



     Oral Tablet      00                                 take with           



                                                  a meal, #           



                                                  30 tab, 1           



                                                  Refill(s)           

 

     Metformin            Yes                      1,000 mg =           Me

moria



     hydrochlori      5-18                               1 tab, PO,           l



     de 1000 MG      19:04:                               BID, 0           [facility]

nn



     Oral Tablet      00                                 Refill(s)           

 

     Metformin            Yes                      1,000 mg =           Me

moria



     hydrochlori      5-18                               1 tab, PO,           l



     de 1000 MG      19:04:                               BID, 0           [facility]

nn



     Oral Tablet      00                                 Refill(s)           

 

     Metformin            Yes                      1,000 mg =           Me

moria



     hydrochlori      5-18                               1 tab, PO,           l



     de 1000 MG      19:04:                               BID, 0           [facility]

nn



     Oral Tablet      00                                 Refill(s)           

 

     Metformin            Yes                      1,000 mg =           Me

moria



     hydrochlori      5-18                               1 tab, PO,           l



     de 1000 MG      19:04:                               BID, 0           [facility]

nn



     Oral Tablet      00                                 Refill(s)           

 

     metformin            Yes                                     [facility]



     (GLUCOPHAGE      3-23                                              [place]



     ) 500 MG      00:00:                                              of



     tablet      00                                                [facility]



                                                                 e







Vital Signs







             Vital Name   Observation Time Observation Value Comments     Source

 

             Body height  2022 14:43:00 165.1 cm                  [facility]

 

             Body weight  2022 14:43:00 91.173 kg                 [facility]

 

             BMI          2022 14:43:00 33.45 kg/m2               [facility]

 

             Height       2021-06-15 13:51:00 165.1 cm                  [facility]

 [place]

 

             Weight       2021-06-15 13:51:00                           [facility]

 [place]

 

             BMI Calculated 2021-06-15 13:51:00                           [facility]

al [place]

 

             Respitory Rate 2021 16:45:00                           [facility]

al [place]

 

             Systolic (mm Hg) 2021 16:45:00                           [facility]

rial [place]

 

             Diastolic (mm Hg) 2021 16:45:00                           [facility]

orial [place]

 

             Respitory Rate 2021 16:30:00                           [facility]

al [place]

 

             Systolic (mm Hg) 2021 16:30:00                           [facility]rett

rial [place]

 

             Diastolic (mm Hg) 2021 16:30:00                           [facility]

[facility] [place]

 

             Respitory Rate 2021 16:16:00                           [facility]

al [place]

 

             Systolic (mm Hg) 2021 16:16:00                           [facility]

rial [place]

 

             Diastolic (mm Hg) 2021 16:16:00                           [facility]

orial [place]

 

             Height       2021 14:49:00 165.1 cm                  [facility]

 [place]

 

             Weight       2021 14:49:00                           [facility]

 [place]

 

             BMI Calculated 2021 14:49:00                           [facility]

al [place]

 

             Systolic (mm Hg) 2021 18:56:00                           [facility]

rial [place]

 

             Diastolic (mm Hg) 2021 18:56:00                           [facility]

[facility] [place]

 

             Heart Rate   2021 18:56:00                           [facility]

 [place]

 

             Height       2021 18:56:00 165.1 cm                  [facility]

 [place]

 

             Weight       2021 18:56:00                           [facility]

 [place]

 

             BMI Calculated 2021 18:56:00                           Loretta au [place]







Procedures







                Procedure       Date / Time     Performing Clinician Source



                                Performed                       

 

                Laparoscopic    2021 05:00:00                 [facility]



                cholecystectomy                                 

 

                IVF                                             [facility]







Plan of Care







             Planned Activity Planned Date Details      Comments     Source

 

             Future Scheduled 2022   TETANUS SHOT (ADULT)              [facility]



             Test         13:59:45     [code = TETANUS SHOT              of Medi

cine



                                       (ADULT)]                  

 

             Future Scheduled 2022   BMI FOLLOW UP PLAN              [facility]



             Test         13:59:45     [code = BMI FOLLOW UP              of Med

icine



                                       PLAN]                     

 

             Future Scheduled 2022   Hepatitis C screening              [facility]



             Test         13:59:45     (procedure) [code =              of Medic

ine



                                       781204214]                

 

             Future Scheduled 2022   Human immunodeficiency              B

[facility]



             Test         13:59:45     virus screening              of [facility]



                                       (procedure) [code =              



                                       307978525]                

 

             Future Scheduled 2022   Screening for malignant              

[facility]



             Test         13:59:45     neoplasm of cervix              of Medici

ne



                                       (procedure) [code =              



                                       536920849]                

 

             Future Scheduled 2022   FLU VACCINE > 6 MONTHS              B

[facility]



             Test         13:59:45     [code = FLU VACCINE > 6              of M

edicine



                                       MONTHS]                   

 

             Future Scheduled 2022   COVID-19 Vaccine (3 -              Ba

[facility]



             Test         13:59:45     Booster for Pfizer              of Medici

ne



                                       series) [code =              



                                       COVID-19 Vaccine (3 -              



                                       Booster for Pfizer              



                                       series)]                  

 

             Future Scheduled 2022   ORT - XR FOOT BILAT 3V Ordered:     B

[facility]



             Test         09:42:33     (CHARGE ONLY) [code = 2022   of Med

icine



                                       80678]                    







Encounters







        Start   End     Encounter Admission Attending Care    Care    Encounter 

Source



        Date/Time Date/Time Type    Type    Clinicians Facility Department ID   

   

 

        2022 Office          AIDA CHRISTY     [id number]

[id number] [facility]



        09:36:20 10:03:54 Visit           [facility] [id number]         

[facility]



                                                Y       [id number]         of



                                                        [phone]         Medi

[place]



                                                        600             e

 

        2022 Outpatient                 [facility]     [id number]

6 [facility]



        09:44:17 09:44:17                                                 Gildardo sutton



                                                                        of



                                                                        [facility]



                                                                        e

 

        2021-06-15 2021 Outpatient                 nullFlavo [facility]    [id number]

[id number] [facility]



        14:00:00 04:59:59                         r       General 03      l



                                                        Surgery         [place]



                                                        [place]         

 

        2021-06-15 2021 Outpatient                 nullFlavo [facility]    [id number]

[id number] [facility]



        14:00:00 04:59:59                         r       General 03      l



                                                        [facility]         

 

        2021-06-15 2021-06-15 Outpatient         Marlen Alicea [facility]    [facility]    [id number]

[id number] 



        09:00:00 23:59:59                 [facility]                    03      

 

        2021-06-15 2021-06-15 Ambulatory                 nullFlavo [facility]    [id number]

[id number] [facility]



        14:00:00 14:00:00 Pre-Reg                 r       General 02      l



                                                        Surgery         [place]



                                                        [place]         

 

        2021-06-15 2021-06-15 Ambulatory                 nullFlavo [facility]    [id number]

26005 [facility]



        14:00:00 14:00:00 Pre-Reg                 r       General 02      l



                                                        [facility]         

 

        2021-06-15 2021-06-15 Outpatient                 [facility]    [id number]

965 [facility]



        09:00:00 09:00:00                                         03      l



                                                                        [place]

 

        2021-06-15 2021-06-15 Outpatient                 [facility]    [facility]    [id number]

965 [facility]



        09:00:00 09:00:00                                         02      l



                                                                        [place]

 

        2021-06-15 2021-06-15 Outpatient         Marlen Alicea [facility]    [facility]    [id number]

[id number] 



        09:00:00 09:00:00                 [facility]                    2021 Day                     nullFlavo [facility] [id number]

975 [facility]



        12:26:00 17:08:00 Surgery                 r       [place] 00      l



                                                        [facility]         

 

        2021 Day                     nullFlavo [facility] [id number]

975 [facility]



        12:26:00 17:08:00 Surgery                 r       [place] 00      l



                                                        [facility]         

 

        [facility]    [id number]

[id number] 



        07:26:00 12:08:00                 Nurys                    2021 Outpatient         MARLEN ALICEA SE    [facility]    [id number] 

   [facility]



        07:26:00 12:08:00                                                 [facility]

 

        2021 [facility]         [facility]    [id number]

[id number] 



        07:26:00 12:08:00                 Nurys                    2021 Outpatient                 [facility]    [facility]    [id number]

965 [facility]



        10:30:00 10:30:00                                         01      [facility]

 

        2021 Outpatient                 [facility]    [facility]    [id number]

965 [facility]



        10:30:00 10:30:00                                         01      [facility]

 

        2021 Outpatient                 nullFlavo [facility]    [id number]

73891 [facility]



        18:45:00 04:59:59                         r       General 00      l



                                                        [facility]         

 

        2021 Outpatient                 nullFlavo MG    [id number]

[id number] [facility]



        18:45:00 04:59:59                         r       General 00      l



                                                        [facility]         

 

        2021 Outpatient         Marlen Alicea MG    MG    [id number]

[id number] 



        13:45:00 23:59:59                 Nurys                    2021 Outpatient                 [facility]    [facility]    [id number]

965 [facility]



        13:45:00 13:45:00                                         00      [facility]

 

        2020 Outpatient         [facility]   [facility]    [id number]

3054 [facility]



        08:11:00 08:11:00                                         34      [place]



                                                                        [facility]



                                                                        [facility]



                                                                        [facility]







Results







           Test Description Test Time  Test Comments Results    Result Comments 

Source









                    URINE CHEM          2021 13:37:00 









                      Test Item  Value      Reference Range Interpretation Comme

nts









             U Preg (test code = U Preg) Negative (21 8:37 AM)              

             



[place] [id number] 13:37:00





             Test Item    Value        Reference Range Interpretation Comments

 

             U Preg (test code = U Negative (21 8:37                        

   



             Preg)        AM)                                    



[facility]2021-06-02 13:37:00





             Test Item    Value        Reference Range Interpretation Comments

 

             U Preg (test code = U Negative (21 8:37                        

   



             Preg)        AM)                                    



[place] [id number] 13:37:00





             Test Item    Value        Reference Range Interpretation Comments

 

             U Preg (test code = U Negative (21 8:37                        

   



             Preg)        AM)                                    



[id number] 12[facility]22[facility]00





             Test Item    Value        Reference Range Interpretation Comments

 

             WBC (test code = WBC) 6.2          3.7-10.4                  



[place] [id number] 12:22:00





             Test Item    Value        Reference Range Interpretation Comments

 

             RBC (test code = RBC) 4.15         4.20-5.40                 



[facility]CL[facility]17[facility] 12:22:00





             Test Item    Value        Reference Range Interpretation Comments

 

             Hgb (test code = Hgb) 13.1         12.0-16.0                 



[place] [id number] 12:22:00





             Test Item    Value        Reference Range Interpretation Comments

 

             Hct (test code = Hct) 39.4         36.0-48.0                 



[place] 870261-06-01 12:22:00





             Test Item    Value        Reference Range Interpretation Comments

 

             MCV (test code = MCV) 95.0         80.0-98.0                 



[place] 870261-06-01 12:22:00





             Test Item    Value        Reference Range Interpretation Comments

 

             MCH (test code = MCH) 31.7 pg      27.0-31.0                 



[place] 870261-06-01 12:22:00





             Test Item    Value        Reference Range Interpretation Comments

 

             MCHC (test code = MCHC) 33.4         32.0-36.0                 



[place] 870261-06-01 12:22:00





             Test Item    Value        Reference Range Interpretation Comments

 

             RDW (test code = RDW) 13.0         11.5-14.5                 



[facility]galwehRECUPSOZET1509[facility] 12[facility]22:00





             Test Item    Value        Reference Range Interpretation Comments

 

             Platelet (test code = Platelet) 247          133-450               

    



[facility] CenterBxebthuJYTCLWVDXP5173-07-15 12:22:00





             Test Item    Value        Reference Range Interpretation Comments

 

             MPV (test code = MPV) 10.0         7.4-10.4                  



[place] 870261-06-01 12:22:00





             Test Item    Value        Reference Range Interpretation Comments

 

             PT (test code = PT) 12.1 s       12.0-14.7                 



[place] 870261-06-01 12:22:00





             Test Item    Value        Reference Range Interpretation Comments

 

             INR (test code = INR) 0.90 1       0.85-1.17                 



[place] 870261-06-01 12:22:00





             Test Item    Value        Reference Range Interpretation Comments

 

             PTT (test code = PTT) 32.0 s       22.9-35.8                 



[place] 870261-06-01 12:22:00





             Test Item    Value        Reference Range Interpretation Comments

 

             Segs (test code = Segs) 55.0         45.0-75.0                 



[place] 870261-06-01 12:22:00





             Test Item    Value        Reference Range Interpretation Comments

 

             Lymphocytes (test code = Lymphocytes) 35.6         20.0-40.0       

          



[facility] CenterUlnyincXQGGXCMZDZ6066-19-76 12:22:00





             Test Item    Value        Reference Range Interpretation Comments

 

             Monocytes (test code = Monocytes) 6.9          2.0-12.0            

      



[place] 870261-06-01 12:22:00





             Test Item    Value        Reference Range Interpretation Comments

 

             Eosinophils (test code = 1.6          See_Comment                [A

utomated message] The



             Eosinophils)                                        system which ge

nerated



                                                                 this result tra

nsmitted



                                                                 reference range

: <=4.0.



                                                                 The reference r

murphy was



                                                                 not used to int

erpret



                                                                 this result as



                                                                 normal/abnormal

.



[place] 870261-06-01 12:22:00





             Test Item    Value        Reference Range Interpretation Comments

 

             Basophils (test code = 0.9          See_Comment                [Aut

omated message] The



             Basophils)                                          system which ge

nerated



                                                                 this result tra

nsmitted



                                                                 reference range

: <=1.0.



                                                                 The reference r

murphy was



                                                                 not used to int

erpret



                                                                 this result as



                                                                 normal/abnormal

.



[place] 870261-06-01 12:22:00





             Test Item    Value        Reference Range Interpretation Comments

 

             Neutrophils # (test code = Neutrophils 3.4          1.5-8.1        

           



             #)                                                  



[place] 870261-06-01 12:22:00





             Test Item    Value        Reference Range Interpretation Comments

 

             Lymphocytes # (test code = Lymphocytes 2.2          1.0-5.5        

           



             #)                                                  



[place] 870261-06-01 12:22:00





             Test Item    Value        Reference Range Interpretation Comments

 

             Monocytes # (test code 0.4          See_Comment                [Aut

omated message] The



             = Monocytes #)                                        system which 

generated



                                                                 this result tra

nsmitted



                                                                 reference range

: <=0.8.



                                                                 The reference r

murphy was



                                                                 not used to int

erpret



                                                                 this result as



                                                                 normal/abnormal

.



[place] 870261-06-01 12:22:00





             Test Item    Value        Reference Range Interpretation Comments

 

             Eosinophils # (test code 0.1          See_Comment                [A

utomated message] The



             = Eosinophils #)                                        system whic

h generated



                                                                 this result tra

nsmitted



                                                                 reference range

: <=0.5.



                                                                 The reference r

murphy was



                                                                 not used to int

erpret



                                                                 this result as



                                                                 normal/abnormal

.



[place] 870261-06-01 12:22:00





             Test Item    Value        Reference Range Interpretation Comments

 

             Basophils # (test code 0.1          See_Comment                [Aut

omated message] The



             = Basophils #)                                        system which 

generated



                                                                 this result tra

nsmitted



                                                                 reference range

: <=0.2.



                                                                 The reference r

murphy was



                                                                 not used to int

erpret



                                                                 this result as



                                                                 normal/abnormal

.



[place] 870261-06-01 12:22:00





             Test Item    Value        Reference Range Interpretation Comments

 

             WBC (test code = WBC) 6.2          3.7-10.4                  



[place] 870261-06-01 12:22:00





             Test Item    Value        Reference Range Interpretation Comments

 

             RBC (test code = RBC) 4.15         4.20-5.40                 



[place] 870261-06-01 12:22:00





             Test Item    Value        Reference Range Interpretation Comments

 

             Hgb (test code = Hgb) 13.1         12.0-16.0                 



[place] [address]-06-01 12:22:00





             Test Item    Value        Reference Range Interpretation Comments

 

             Hct (test code = Hct) 39.4         36.0-48.0                 



[place] 870261-06-01 12:22:00





             Test Item    Value        Reference Range Interpretation Comments

 

             MCV (test code = MCV) 95.0         80.0-98.0                 



[place] 870261-06-01 12:22:00





             Test Item    Value        Reference Range Interpretation Comments

 

             MCH (test code = MCH) 31.7 pg      27.0-31.0                 



[place] 870261-06-01 12:22:00





             Test Item    Value        Reference Range Interpretation Comments

 

             MCHC (test code = MCHC) 33.4         32.0-36.0                 



[facility] CenterSepuajcIOFMWACPMY3890-76-22 12:22:00





             Test Item    Value        Reference Range Interpretation Comments

 

             RDW (test code = RDW) 13.0         11.5-14.5                 



[place] 870261-06-01 12:22:00





             Test Item    Value        Reference Range Interpretation Comments

 

             Platelet (test code = Platelet) 247          133-450               

    



[facility] CenterLaxspwzDGZCWWLYTM5900-34-30 12:22:00





             Test Item    Value        Reference Range Interpretation Comments

 

             MPV (test code = MPV) 10.0         7.4-10.4                  



[facility] CenterWiufwyhHHLUIINFNY2116-68-58 12:22:00





             Test Item    Value        Reference Range Interpretation Comments

 

             PT (test code = PT) 12.1 s       12.0-14.7                 



[place] 870261-06-01 12:22:00





             Test Item    Value        Reference Range Interpretation Comments

 

             INR (test code = INR) 0.90 1       0.85-1.17                 



[place] 870261-06-01 12:22:00





             Test Item    Value        Reference Range Interpretation Comments

 

             PTT (test code = PTT) 32.0 s       22.9-35.8                 



[place] 870261-06-01 12:22:00





             Test Item    Value        Reference Range Interpretation Comments

 

             Segs (test code = Segs) 55.0         45.0-75.0                 



[facility] CenterDkjkesvHAJLINIETF5953-12-85 12:22:00





             Test Item    Value        Reference Range Interpretation Comments

 

             Lymphocytes (test code = Lymphocytes) 35.6         20.0-40.0       

          



[place] 870261-06-01 12:22:00





             Test Item    Value        Reference Range Interpretation Comments

 

             Monocytes (test code = Monocytes) 6.9          2.0-12.0            

      



[place] 870261-06-01 12:22:00





             Test Item    Value        Reference Range Interpretation Comments

 

             Eosinophils (test code = 1.6          See_Comment                [A

utomated message] The



             Eosinophils)                                        system which ge

nerated



                                                                 this result tra

nsmitted



                                                                 reference range

: <=4.0.



                                                                 The reference r

murphy was



                                                                 not used to int

erpret



                                                                 this result as



                                                                 normal/abnormal

.



[facility] CenterVglnxhlTOUJHHKACA6016-25-76 12:22:00





             Test Item    Value        Reference Range Interpretation Comments

 

             Basophils (test code = 0.9          See_Comment                [Aut

omated message] The



             Basophils)                                          system which ge

nerated



                                                                 this result tra

nsmitted



                                                                 reference range

: <=1.0.



                                                                 The reference r

murphy was



                                                                 not used to int

erpret



                                                                 this result as



                                                                 normal/abnormal

.



[place] 870261-06-01 12:22:00





             Test Item    Value        Reference Range Interpretation Comments

 

             Neutrophils # (test code = Neutrophils 3.4          1.5-8.1        

           



             #)                                                  



[place] 870261-06-01 12:22:00





             Test Item    Value        Reference Range Interpretation Comments

 

             Lymphocytes # (test code = Lymphocytes 2.2          1.0-5.5        

           



             #)                                                  



[place] 870261-06-01 12:22:00





             Test Item    Value        Reference Range Interpretation Comments

 

             Monocytes # (test code 0.4          See_Comment                [Aut

omated message] The



             = Monocytes #)                                        system which 

generated



                                                                 this result tra

nsmitted



                                                                 reference range

: <=0.8.



                                                                 The reference r

murphy was



                                                                 not used to int

erpret



                                                                 this result as



                                                                 normal/abnormal

.



[place] 870261-06-01 12:22:00





             Test Item    Value        Reference Range Interpretation Comments

 

             Eosinophils # (test code 0.1          See_Comment                [A

utomated message] The



             = Eosinophils #)                                        system whic

h generated



                                                                 this result tra

nsmitted



                                                                 reference range

: <=0.5.



                                                                 The reference r

murphy was



                                                                 not used to int

erpret



                                                                 this result as



                                                                 normal/abnormal

.



[facility] CenterXvptqvePZLOUIOJQT5610-48-22 12:22:00





             Test Item    Value        Reference Range Interpretation Comments

 

             Basophils # (test code 0.1          See_Comment                [Aut

omated message] The



             = Basophils #)                                        system which 

generated



                                                                 this result tra

nsmitted



                                                                 reference range

: <=0.2.



                                                                 The reference r

murphy was



                                                                 not used to int

erpret



                                                                 this result as



                                                                 normal/abnormal

.



[place] 870261-06-01 12:22:00





             Test Item    Value        Reference Range Interpretation Comments

 

             WBC (test code = WBC) 6.2          3.7-10.4                  



[place] 870261-06-01 12:22:00





             Test Item    Value        Reference Range Interpretation Comments

 

             RBC (test code = RBC) 4.15         4.20-5.40                 



[place] 870261-06-01 12:22:00





             Test Item    Value        Reference Range Interpretation Comments

 

             Hgb (test code = Hgb) 13.1         12.0-16.0                 



[place] 870261-06-01 12:22:00





             Test Item    Value        Reference Range Interpretation Comments

 

             Hct (test code = Hct) 39.4         36.0-48.0                 



[place] 870261-06-01 12:22:00





             Test Item    Value        Reference Range Interpretation Comments

 

             MCV (test code = MCV) 95.0         80.0-98.0                 



[place] 870261-06-01 12:22:00





             Test Item    Value        Reference Range Interpretation Comments

 

             MCH (test code = MCH) 31.7 pg      27.0-31.0                 



[place] 870261-06-01 12:22:00





             Test Item    Value        Reference Range Interpretation Comments

 

             MCHC (test code = MCHC) 33.4         32.0-36.0                 



[place] 870261-06-01 12:22:00





             Test Item    Value        Reference Range Interpretation Comments

 

             RDW (test code = RDW) 13.0         11.5-14.5                 



[place] 870261-06-01 12:22:00





             Test Item    Value        Reference Range Interpretation Comments

 

             Platelet (test code = Platelet) 247          133-450               

    



[facility] CenterIjsphhlMYXNTECCMT9257-70-23 12:22:00





             Test Item    Value        Reference Range Interpretation Comments

 

             MPV (test code = MPV) 10.0         7.4-10.4                  



[place] [address]-06-01 12:22:00





             Test Item    Value        Reference Range Interpretation Comments

 

             PT (test code = PT) 12.1 s       12.0-14.7                 



[place] 870261-06-01 12:22:00





             Test Item    Value        Reference Range Interpretation Comments

 

             INR (test code = INR) 0.90 1       0.85-1.17                 



[place] 870261-06-01 12:22:00





             Test Item    Value        Reference Range Interpretation Comments

 

             PTT (test code = PTT) 32.0 s       22.9-35.8                 



[place] 870261-06-01 12:22:00





             Test Item    Value        Reference Range Interpretation Comments

 

             Segs (test code = Segs) 55.0         45.0-75.0                 



[facility] CenterPmrmftlAEPRJRCVEK0576-13-86 12:22:00





             Test Item    Value        Reference Range Interpretation Comments

 

             Lymphocytes (test code = Lymphocytes) 35.6         20.0-40.0       

          



[facility] CenterUocihhjLAVIHRECIV0060-42-76 12:22:00





             Test Item    Value        Reference Range Interpretation Comments

 

             Monocytes (test code = Monocytes) 6.9          2.0-12.0            

      



[place] 870261-06-01 12:22:00





             Test Item    Value        Reference Range Interpretation Comments

 

             Eosinophils (test code = 1.6          See_Comment                [A

utomated message] The



             Eosinophils)                                        system which ge

nerated



                                                                 this result tra

nsmitted



                                                                 reference range

: <=4.0.



                                                                 The reference r

murphy was



                                                                 not used to int

erpret



                                                                 this result as



                                                                 normal/abnormal

.



[place] 870261-06-01 12:22:00





             Test Item    Value        Reference Range Interpretation Comments

 

             Basophils (test code = 0.9          See_Comment                [Aut

omated message] The



             Basophils)                                          system which ge

nerated



                                                                 this result tra

nsmitted



                                                                 reference range

: <=1.0.



                                                                 The reference r

murphy was



                                                                 not used to int

erpret



                                                                 this result as



                                                                 normal/abnormal

.



[facility] CenterDmzpxusQCGOISKOMT0964-51-22 12:22:00





             Test Item    Value        Reference Range Interpretation Comments

 

             Neutrophils # (test code = Neutrophils 3.4          1.5-8.1        

           



             #)                                                  



[place] 870261-06-01 12:22:00





             Test Item    Value        Reference Range Interpretation Comments

 

             Lymphocytes # (test code = Lymphocytes 2.2          1.0-5.5        

           



             #)                                                  



[place] 870261-06-01 12:22:00





             Test Item    Value        Reference Range Interpretation Comments

 

             Monocytes # (test code 0.4          See_Comment                [Aut

omated message] The



             = Monocytes #)                                        system which 

generated



                                                                 this result tra

nsmitted



                                                                 reference range

: <=0.8.



                                                                 The reference r

murphy was



                                                                 not used to int

erpret



                                                                 this result as



                                                                 normal/abnormal

.



[facility] CenterRnqjbjoNMSSVBCLFM8705-83-66 12:22:00





             Test Item    Value        Reference Range Interpretation Comments

 

             Eosinophils # (test code 0.1          See_Comment                [A

utomated message] The



             = Eosinophils #)                                        system ic

h generated



                                                                 this result tra

nsmitted



                                                                 reference range

: <=0.5.



                                                                 The reference r

murphy was



                                                                 not used to int

erpret



                                                                 this result as



                                                                 normal/abnormal

.



[facility] CenterWkgkeyyVUPTVOLUSU3351-83-15 12:22:00





             Test Item    Value        Reference Range Interpretation Comments

 

             Basophils # (test code 0.1          See_Comment                [Aut

omated message] The



             = Basophils #)                                        system which 

generated



                                                                 this result tra

nsmitted



                                                                 reference range

: <=0.2.



                                                                 The reference r

murphy was



                                                                 not used to int

erpret



                                                                 this result as



                                                                 normal/abnormal

.



[facility] CenterGhbnzrnTOQIDRDAQW4336-65-88 12:22:00





             Test Item    Value        Reference Range Interpretation Comments

 

             WBC (test code = WBC) 6.2          3.7-10.4                  



[place] 8[place]261-06-01 12:22:00





             Test Item    Value        Reference Range Interpretation Comments

 

             RBC (test code = RBC) 4.15         4.20-5.40                 



[place] [id number] 12:22:00





             Test Item    Value        Reference Range Interpretation Comments

 

             Hgb (test code = Hgb) 13.1         12.0-16.0                 



[place] [id number] 12:22:00





             Test Item    Value        Reference Range Interpretation Comments

 

             Hct (test code = Hct) 39.4         36.0-48.0                 



[place] [id number] 12:22:00





             Test Item    Value        Reference Range Interpretation Comments

 

             MCV (test code = MCV) 95.0         80.0-98.0                 



[id number]





             Test Item    Value        Reference Range Interpretation Comments

 

             MCH (test code = MCH) 31.7 pg      27.0-31.0                 



[id number] 12:22:00





             Test Item    Value        Reference Range Interpretation Comments

 

             MCHC (test code = MCHC) 33.4         32.0-36.0                 



[place] [id number] 12:22:00





             Test Item    Value        Reference Range Interpretation Comments

 

             RDW (test code = RDW) 13.0         11.5-14.5                 



[place] [id number] 12:22:00





             Test Item    Value        Reference Range Interpretation Comments

 

             Platelet (test code = Platelet) 247          133-450               

    



[id number] 12[facility]22:00





             Test Item    Value        Reference Range Interpretation Comments

 

             MPV (test code = MPV) 10.0         7.4-10.4                  



[facility] CenterEnxonafQQFLKFJFNA2434-83-65 12:22:00





             Test Item    Value        Reference Range Interpretation Comments

 

             PT (test code = PT) 12.1 s       12.0-14.7                 



[place] [id number] 12:22:00





             Test Item    Value        Reference Range Interpretation Comments

 

             INR (test code = INR) 0.90 1       0.85-1.17                 



[place] [id number] 12:22:00





             Test Item    Value        Reference Range Interpretation Comments

 

             PTT (test code = PTT) 32.0 s       22.9-35.8                 



[place] [id number] 12:22:00





             Test Item    Value        Reference Range Interpretation Comments

 

             Segs (test code = Segs) 55.0         45.0-75.0                 



[place] [id number] 12:22:00





             Test Item    Value        Reference Range Interpretation Comments

 

             Lymphocytes (test code = Lymphocytes) 35.6         20.0-40.0       

          



[place] 870261-06-01 12:22:00





             Test Item    Value        Reference Range Interpretation Comments

 

             Monocytes (test code = Monocytes) 6.9          2.0-12.0            

      



[facility] [id number] 12:22:00





             Test Item    Value        Reference Range Interpretation Comments

 

             Eosinophils (test code = 1.6          See_Comment                [A

utomated message] The



             Eosinophils)                                        system which ge

nerated



                                                                 this result tra

nsmitted



                                                                 reference range

: <=4.0.



                                                                 The reference r

murphy was



                                                                 not used to int

erpret



                                                                 this result as



                                                                 normal/abnormal

.



[place] 870261-06-01 12:22:00





             Test Item    Value        Reference Range Interpretation Comments

 

             Basophils (test code = 0.9          See_Comment                [Aut

omated message] The



             Basophils)                                          system which ge

nerated



                                                                 this result tra

nsmitted



                                                                 reference range

: <=1.0.



                                                                 The reference r

murphy was



                                                                 not used to int

erpret



                                                                 this result as



                                                                 normal/abnormal

.



[facility] [id number] 12:22:00





             Test Item    Value        Reference Range Interpretation Comments

 

             Neutrophils # (test code = Neutrophils 3.4          1.5-8.1        

           



             #)                                                  



[place] 870261-06-01 12:22:00





             Test Item    Value        Reference Range Interpretation Comments

 

             Lymphocytes # (test code = Lymphocytes 2.2          1.0-5.5        

           



             #)                                                  



[place] [id number] 12:22:00





             Test Item    Value        Reference Range Interpretation Comments

 

             Monocytes # (test code 0.4          See_Comment                [Aut

omated message] The



             = Monocytes #)                                        system which 

generated



                                                                 this result tra

nsmitted



                                                                 reference range

: <=0.8.



                                                                 The reference r

murphy was



                                                                 not used to int

erpret



                                                                 this result as



                                                                 normal/abnormal

.



[facility] CenterLsvzuwsNLXFOLXJHN2633-10-01 12:22:00





             Test Item    Value        Reference Range Interpretation Comments

 

             Eosinophils # (test code 0.1          See_Comment                [A

utomated message] The



             = Eosinophils #)                                        system whic

h generated



                                                                 this result tra

nsmitted



                                                                 reference range

: <=0.5.



                                                                 The reference r

murphy was



                                                                 not used to int

erpret



                                                                 this result as



                                                                 normal/abnormal

.



[facility] [id number] 12:22:00





             Test Item    Value        Reference Range Interpretation Comments

 

             Basophils # (test code 0.1          See_Comment                [Aut

omated message] The



             = Basophils #)                                        system which 

generated



                                                                 this result tra

nsmitted



                                                                 reference range

: <=0.2.



                                                                 The reference r

murphy was



                                                                 not used to int

erpret



                                                                 this result as



                                                                 normal/abnormal

.



[id number]





             Test Item    Value        Reference Range Interpretation Comments

 

             Coronavirus (COVID-19) Not Detected (21                        

   



             SAQIB (test code = 7:11 AM)                               



             Coronavirus (COVID-19)                                        



             SAQIB)                                                



[facility] SoutheastZ[id number] 12:11:00





             Test Item    Value        Reference Range Interpretation Comments

 

             Coronavirus (COVID-19) Not Detected (21                        

   



             SAQIB (test code = 7:11 AM)                               



             Coronavirus (COVID-19)                                        



             SAQIB)                                                



[facility] SoutheastFnnvzcpEIZQGEXWTW2306-21-37 12:11:00





             Test Item    Value        Reference Range Interpretation Comments

 

             Coronavirus (COVID-19) Not Detected (21                        

   



             SAQIB (test code = 7:11 AM)                               



             Coronavirus (COVID-19)                                        



             SAQIB)                                                



[facility] So[id number] 12:11:00





             Test Item    Value        Reference Range Interpretation Comments

 

             Coronavirus (COVID-19) Not Detected (21                        

   



             SAQIB (test code = 7:11 AM)                               



             Coronavirus (COVID-19)                                        



             SAQIB)                                                



[facility] [id number] 12:16:00





             Test Item    Value        Reference Range Interpretation Comments

 

             HCG SERUM    [id number] mIU/ml 0-6          H            Values for B-h

CG generally



             (test code =                                        peak during the

 first



             HCG)                                                trimesterand de

matute



                                                                 slowly througho

ut the



                                                                 remainder of 

epregnancy.



                                                                 A sharply reduc

ed or



                                                                 falling serum B

-hCG



                                                                 levelmay indica

te an



                                                                 abnormal pregna

ncy, and



                                                                 additonal



                                                                 clinicalevaluat

ion and



                                                                 follow-up may b

e



                                                                 appropriate. HC

G



                                                                 rangesduring no

rmal



                                                                 pregnancy, as r

eported in



                                                                 the literature,



                                                                 aresummarized



                                                                 below..........

...........



                                                                 ...............

...........



                                                                 ..............A

pproximate



                                                                 hCG Approximate



                                                                 GestationalRang

e



                                                                 mIU/mL[IU/L]



                                                                 Age------------

----



                                                                 ---------------

------5 -



                                                                 50 0.2 - 1 Week

50 - 500 1



                                                                 - 2 Jptml125 - 

5000 2 - 3



                                                                 Eslcj812 - 10,0

00  3 - 4



                                                                 Xzfhb2485 - 50,

000 4 - 5



                                                                 Weeks10,000 - 1

00,000 5 -



                                                                 6 Weeks15,000 -

 200,000 6



                                                                 - 8 Weeks10,000

 - 100,000



                                                                 2 - 3 Months